# Patient Record
Sex: FEMALE | Race: WHITE | NOT HISPANIC OR LATINO | Employment: UNEMPLOYED | URBAN - METROPOLITAN AREA
[De-identification: names, ages, dates, MRNs, and addresses within clinical notes are randomized per-mention and may not be internally consistent; named-entity substitution may affect disease eponyms.]

---

## 2021-01-18 ENCOUNTER — CLINICAL SUPPORT (OUTPATIENT)
Dept: URGENT CARE | Facility: CLINIC | Age: 61
End: 2021-01-18
Payer: MEDICAID

## 2021-01-18 DIAGNOSIS — Z20.822 COVID-19 RULED OUT: Primary | ICD-10-CM

## 2021-01-18 LAB
CTP QC/QA: YES
SARS-COV-2 RDRP RESP QL NAA+PROBE: NEGATIVE

## 2021-01-18 PROCEDURE — 87635: ICD-10-PCS | Mod: QW,S$GLB,, | Performed by: NURSE PRACTITIONER

## 2021-01-18 PROCEDURE — 87635 SARS-COV-2 COVID-19 AMP PRB: CPT | Mod: QW,S$GLB,, | Performed by: NURSE PRACTITIONER

## 2022-10-07 PROBLEM — R07.89 OTHER CHEST PAIN: Status: ACTIVE | Noted: 2022-10-07

## 2022-10-07 PROBLEM — I10 PRIMARY HYPERTENSION: Status: ACTIVE | Noted: 2022-10-07

## 2022-10-07 PROBLEM — G47.62 NOCTURNAL LEG CRAMPS: Status: ACTIVE | Noted: 2022-10-07

## 2022-10-07 PROBLEM — G25.81 RESTLESS LEGS: Status: ACTIVE | Noted: 2022-10-07

## 2022-10-07 PROBLEM — I87.2 CHRONIC VENOUS INSUFFICIENCY OF LOWER EXTREMITY: Status: ACTIVE | Noted: 2022-10-07

## 2022-10-07 PROBLEM — R60.0 LEG EDEMA, LEFT: Status: ACTIVE | Noted: 2022-10-07

## 2022-10-07 PROBLEM — R06.09 DOE (DYSPNEA ON EXERTION): Status: ACTIVE | Noted: 2022-10-07

## 2023-06-19 PROBLEM — S00.83XA FACIAL CONTUSION, INITIAL ENCOUNTER: Status: ACTIVE | Noted: 2023-06-19

## 2023-08-11 ENCOUNTER — TELEPHONE (OUTPATIENT)
Dept: ORTHOPEDICS | Facility: CLINIC | Age: 63
End: 2023-08-11
Payer: MEDICAID

## 2023-08-11 NOTE — TELEPHONE ENCOUNTER
----- Message from Nick Moralez sent at 8/11/2023  2:56 PM CDT -----  Regarding: appt  Contact: @[phone]  Pt esvin  calling to get f/u appt sched according to hospt discharge notes form 6/26 Acute pain of left knee (Primary)  [S83.92XA] Sprain of left knee, unspecified ligament, initial encounter pls call and adv@[phone]

## 2023-08-17 ENCOUNTER — TELEPHONE (OUTPATIENT)
Dept: ORTHOPEDICS | Facility: CLINIC | Age: 63
End: 2023-08-17
Payer: MEDICAID

## 2023-08-17 NOTE — TELEPHONE ENCOUNTER
----- Message from Lucille Miramontes MA sent at 8/16/2023  4:35 PM CDT -----  Regarding: FW: Appt Access  Contact: [phone]    ----- Message -----  From: Dorothea Melchor  Sent: 8/16/2023   3:45 PM CDT  To: [facility] Staff  Subject: Appt Access                                      SCHEDULING/REQUEST    Appt Type: NP    Date/Time Preference:     Treating Provider: [facility]    Caller Name: Pallavi Lorenzana    Contact Preference: [phone]    Comments/Notes: Pt has appt sched 12/22/2023 and is requesting to be seen sooner for left knee pain due to a fall.  Epic denied scheduling.

## 2023-08-23 ENCOUNTER — TELEPHONE (OUTPATIENT)
Dept: ORTHOPEDICS | Facility: CLINIC | Age: 63
End: 2023-08-23
Payer: MEDICAID

## 2023-08-23 NOTE — TELEPHONE ENCOUNTER
"----- Message from Ryland Aaron Laoe sent at 8/23/2023 12:05 PM CDT -----  Regarding: Catarina "sister"  Type:  Sooner Appointment Request     Patient is requesting a sooner appointment.  Patient declined first available appointment listed as well as another facility and provider .  Patient will not accept being placed on the waitlist and is requesting a message be sent to doctor.     Name of Caller: Catarina     When is the first available appointment? 12/22    Symptoms: Left knee pain     Would the patient rather a call back or a response via [facility]? Both      Best Call Back Number: [phone]      Additional Information:     "

## 2024-01-13 PROBLEM — M25.562 ACUTE PAIN OF LEFT KNEE: Status: ACTIVE | Noted: 2024-01-13

## 2024-02-19 ENCOUNTER — TELEPHONE (OUTPATIENT)
Dept: ORTHOPEDICS | Facility: CLINIC | Age: 64
End: 2024-02-19
Payer: MEDICAID

## 2024-02-19 NOTE — TELEPHONE ENCOUNTER
Returned the pt's call to scheduled her an appt. No answer LVM our 1st available is 06/04/24 at 1pm.

## 2024-02-19 NOTE — TELEPHONE ENCOUNTER
----- Message from Rashida Negron sent at 2/19/2024 11:51 AM CST -----  Type:  Appointment Request    Name of Caller:DEVANTE CARIAS [[id number]]  When is the first available appointment?No access  Symptoms:Np medicaid   Would the patient rather a call back or a response via [facility]? Call back   Best Call Back Number: [phone]  Additional Information: Patient indicates she would like to schedule an appointment with the department. Patient indicates she was scheduled for an appointment back in December but canceled as she was seen sooner at a different facility. Patient indicates she would like to be seen with the department as she is having issues with her left knee swelling and issues walking with the knee as well. Patient indicates she would like to be seen as soon as possible as a new medicaid patient.

## 2024-05-31 DIAGNOSIS — M25.562 LEFT KNEE PAIN, UNSPECIFIED CHRONICITY: Primary | ICD-10-CM
